# Patient Record
Sex: FEMALE | Race: OTHER | URBAN - METROPOLITAN AREA
[De-identification: names, ages, dates, MRNs, and addresses within clinical notes are randomized per-mention and may not be internally consistent; named-entity substitution may affect disease eponyms.]

---

## 2022-06-10 ENCOUNTER — OFFICE VISIT (OUTPATIENT)
Dept: FAMILY MEDICINE CLINIC | Age: 22
End: 2022-06-10

## 2022-06-10 VITALS
OXYGEN SATURATION: 98 % | HEIGHT: 58 IN | TEMPERATURE: 98.2 F | SYSTOLIC BLOOD PRESSURE: 128 MMHG | BODY MASS INDEX: 22.67 KG/M2 | WEIGHT: 108 LBS | HEART RATE: 87 BPM | DIASTOLIC BLOOD PRESSURE: 88 MMHG

## 2022-06-10 DIAGNOSIS — L60.0 INGROWING TOENAIL OF LEFT FOOT: ICD-10-CM

## 2022-06-10 DIAGNOSIS — L60.0 INGROWING TOENAIL OF RIGHT FOOT: Primary | ICD-10-CM

## 2022-06-10 PROCEDURE — 99202 OFFICE O/P NEW SF 15 MIN: CPT | Performed by: FAMILY MEDICINE

## 2022-06-10 NOTE — PROGRESS NOTES
Kelly Guzman is a 23 y.o. female   Chief Complaint   Patient presents with    Nail Problem     Pt states she has ingrown toenail on both feet x 3 months . Reocurrence because she had the same issue one year ago on right toe    Establish Care         ASSESSMENT AND PLAN:    1. Ingrowing toenail of right foot  2. Ingrowing toenail of left foot  Left is worse than right at this time. Will schedule appointment for removal next week. Soak over the weekend. SUBJECTIVE:    HPI:  Rafaellan Oskathryn. Jenelle John is a 23 y.o. female who presents with ingrowing great toenails. She reports she had the same issue and last year she had the right toenail completely removed but it grew back ingrown again. Her sister helped her trim it yesterday and it doesn't hurt today. Her left side however, is also ingrown and it is very painful today. There is swelling and redness on the nail fold. She has been washing it with a medical soap that her parents sent her from her home country. It helps a little. PMH: ingrown toenails  PSH: toenail removal  MEDS: none  ALL: NKDA  SH: Denies   FH: Non-contributory      Review of Systems   Constitutional: Negative for fever and malaise/fatigue. Eyes: Negative for blurred vision. Respiratory: Negative for cough and shortness of breath. Cardiovascular: Negative for chest pain, palpitations and leg swelling. Gastrointestinal: Negative for abdominal pain, constipation, diarrhea, nausea and vomiting. Neurological: Negative for dizziness and headaches. /88 (BP 1 Location: Right arm, BP Patient Position: Sitting)   Pulse 87   Temp 98.2 °F (36.8 °C) (Temporal)   Ht 4' 10.27\" (1.48 m)   Wt 108 lb (49 kg)   LMP 05/12/2022   SpO2 98%   BMI 22.36 kg/m²     Physical Exam  Constitutional:       General: She is not in acute distress. Appearance: Normal appearance. Eyes:      Extraocular Movements: Extraocular movements intact.       Conjunctiva/sclera: Conjunctivae normal.      Pupils: Pupils are equal, round, and reactive to light. Cardiovascular:      Rate and Rhythm: Normal rate and regular rhythm. Heart sounds: Normal heart sounds. Pulmonary:      Effort: Pulmonary effort is normal.      Breath sounds: Normal breath sounds. Feet:      Right foot:      Skin integrity: Erythema present. Toenail Condition: Right toenails are ingrown. Fungal disease present. Left foot:      Skin integrity: Skin breakdown and erythema present. Toenail Condition: Left toenails are ingrown. Fungal disease present. Neurological:      Mental Status: She is alert.

## 2022-06-13 ENCOUNTER — OFFICE VISIT (OUTPATIENT)
Dept: FAMILY MEDICINE CLINIC | Age: 22
End: 2022-06-13

## 2022-06-13 VITALS
WEIGHT: 108 LBS | OXYGEN SATURATION: 100 % | BODY MASS INDEX: 22.67 KG/M2 | TEMPERATURE: 97.9 F | HEART RATE: 88 BPM | HEIGHT: 58 IN | SYSTOLIC BLOOD PRESSURE: 108 MMHG | DIASTOLIC BLOOD PRESSURE: 62 MMHG

## 2022-06-13 DIAGNOSIS — L60.0 INGROWING RIGHT GREAT TOENAIL: ICD-10-CM

## 2022-06-13 DIAGNOSIS — L60.0 INGROWING LEFT GREAT TOENAIL: Primary | ICD-10-CM

## 2022-06-13 PROCEDURE — 11730 AVULSION NAIL PLATE SIMPLE 1: CPT | Performed by: FAMILY MEDICINE

## 2022-06-13 PROCEDURE — 99214 OFFICE O/P EST MOD 30 MIN: CPT | Performed by: FAMILY MEDICINE

## 2022-06-13 NOTE — PROGRESS NOTES
Rafa Glasgow is a 23 y.o. female   Chief Complaint   Patient presents with    Ingrown Toenail     f/up         ASSESSMENT AND PLAN:    1. Ingrowing left great toenail  2. Ingrowing right great toenail  Successful removal.  Recommend Ibuprofen 800mg TID for the next 48-72hrs  Care reviewed. F/U PRN.  - REMOVAL OF NAIL PLATE  - REMOVAL OF NAIL PLATE    SUBJECTIVE:    HPI:  Jeniffer Andujar. Lisabeth Cockayne is a 23 y.o. female who presents for B/L toenail removal for ingrown toenails. No new concerns. Review of Systems   Constitutional: Negative. Respiratory: Negative. Cardiovascular: Negative. Gastrointestinal: Negative. Neurological: Negative. /62 (BP 1 Location: Right arm, BP Patient Position: Sitting)   Pulse 88   Temp 97.9 °F (36.6 °C) (Temporal)   Ht 4' 10.27\" (1.48 m)   Wt 108 lb (49 kg)   LMP 05/12/2022   SpO2 100%   BMI 22.36 kg/m²     Physical Exam  Constitutional:       General: She is not in acute distress. Appearance: Normal appearance. Pulmonary:      Effort: Pulmonary effort is normal.   Feet:      Right foot:      Skin integrity: Erythema and callus present. Toenail Condition: Right toenails are ingrown. Left foot:      Skin integrity: Skin breakdown and erythema present. Toenail Condition: Left toenails are ingrown. Neurological:      Mental Status: She is alert and oriented to person, place, and time. Procedure Name: Ingrown Toenail Removal  Indication: Pain, infection  Location: Left great toenail AND right great toenail. Pre-Procedure Diagnosis: Ingrown Toenail  Post-Procedure Diagnosis: Same, removed  Informed consent: Procedure, alternate treatment options, risks, and benefits were thoroughly explained to the patient and informed consent was obtained before the procedure started. Equipment for the procedure was set up. PROCEDURE  An appropriate timeout was taken.  The procedure and site were confirmed by patient and team.  -The area was prepped and draped in the usual sterile fashion. A digital block was done using 7cc of Lidocaine 1% without epinephrine for local anesthesia in each toe  -The nail elevator was used to free the proximal nail fold from the nail plate below it. -Then, the nail elevator was inserted under the nail plate of the ingrown part of the toenail to separate the nail plate from the nail bed. The nail elevator was sent all the way back to the nail matrix. -A straight hemostat was then advanced under the free portion of the nail, as far as possible to grab the entire nail. It was then clamped down on this free nail segment and twisted and turned until the entire  piece of toenail was removed. The entire piece was confirmed to be fully removed intact.  -Hemostasis was achieved through direct pressure. - Silver nitrate was used on each edge for cauterization of the aberrant nail bed. The wound was copiously irrigated  Estimated blood loss was <5cc. Triple antibiotic ointment was applied. This was repeated on the other side. A dressing was applied to the area. Anticipatory guidance, as well as standard post-procedure care, was explained. Return precautions are given. The patient tolerated the procedure well without complications. Follow-up visit scheduled.

## 2022-06-13 NOTE — PROGRESS NOTES
An After Visit Summary was printed and given to the patient. RN explained aftercare per MD instructions. Time for questions and answers provided, patient verbalized understanding. Patient discharged from clinic in stable condition.

## 2022-06-13 NOTE — PROGRESS NOTES
Coordination of Care  1. Have you been to the ER, urgent care clinic since your last visit? Hospitalized since your last visit? No    2. Have you seen or consulted any other health care providers outside of the [address] [place] [address] since your last visit? Include any pap smears or colon screening. No    Does the patient need refills? NO    Learning Assessment Complete?  yes  Depression Screening complete in the past 12 months? yes